# Patient Record
Sex: MALE | Race: WHITE | Employment: OTHER | URBAN - METROPOLITAN AREA
[De-identification: names, ages, dates, MRNs, and addresses within clinical notes are randomized per-mention and may not be internally consistent; named-entity substitution may affect disease eponyms.]

---

## 2021-03-05 ENCOUNTER — HOSPITAL ENCOUNTER (EMERGENCY)
Age: 66
Discharge: HOME | End: 2021-03-05
Attending: EMERGENCY MEDICINE
Payer: COMMERCIAL

## 2021-03-05 VITALS
HEART RATE: 72 BPM | SYSTOLIC BLOOD PRESSURE: 136 MMHG | RESPIRATION RATE: 16 BRPM | TEMPERATURE: 97.1 F | OXYGEN SATURATION: 98 % | DIASTOLIC BLOOD PRESSURE: 87 MMHG

## 2021-03-05 DIAGNOSIS — N20.1 URETEROLITHIASIS: Primary | ICD-10-CM

## 2021-03-05 LAB
-: ABNORMAL
ABSOLUTE EOS #: 0.05 K/UL (ref 0–0.44)
ABSOLUTE IMMATURE GRANULOCYTE: 0.04 K/UL (ref 0–0.3)
ABSOLUTE LYMPH #: 0.84 K/UL (ref 1.1–3.7)
ABSOLUTE MONO #: 0.6 K/UL (ref 0.1–1.2)
AMORPHOUS: ABNORMAL
ANION GAP SERPL CALCULATED.3IONS-SCNC: 10 MMOL/L (ref 9–17)
BACTERIA: ABNORMAL
BASOPHILS # BLD: 0 % (ref 0–2)
BASOPHILS ABSOLUTE: 0.03 K/UL (ref 0–0.2)
BILIRUBIN URINE: NEGATIVE
BUN BLDV-MCNC: 19 MG/DL (ref 8–23)
BUN/CREAT BLD: NORMAL (ref 9–20)
CALCIUM SERPL-MCNC: 9 MG/DL (ref 8.6–10.4)
CASTS UA: ABNORMAL /LPF (ref 0–8)
CHLORIDE BLD-SCNC: 104 MMOL/L (ref 98–107)
CO2: 25 MMOL/L (ref 20–31)
COLOR: YELLOW
CREAT SERPL-MCNC: 0.96 MG/DL (ref 0.7–1.2)
CRYSTALS, UA: ABNORMAL /HPF
DIFFERENTIAL TYPE: ABNORMAL
EOSINOPHILS RELATIVE PERCENT: 1 % (ref 1–4)
EPITHELIAL CELLS UA: ABNORMAL /HPF (ref 0–5)
GFR AFRICAN AMERICAN: >60 ML/MIN
GFR NON-AFRICAN AMERICAN: >60 ML/MIN
GFR SERPL CREATININE-BSD FRML MDRD: NORMAL ML/MIN/{1.73_M2}
GFR SERPL CREATININE-BSD FRML MDRD: NORMAL ML/MIN/{1.73_M2}
GLUCOSE BLD-MCNC: 96 MG/DL (ref 70–99)
GLUCOSE URINE: NEGATIVE
HCT VFR BLD CALC: 42.1 % (ref 40.7–50.3)
HEMOGLOBIN: 14.3 G/DL (ref 13–17)
IMMATURE GRANULOCYTES: 0 %
KETONES, URINE: NEGATIVE
LEUKOCYTE ESTERASE, URINE: NEGATIVE
LYMPHOCYTES # BLD: 8 % (ref 24–43)
MCH RBC QN AUTO: 33 PG (ref 25.2–33.5)
MCHC RBC AUTO-ENTMCNC: 34 G/DL (ref 28.4–34.8)
MCV RBC AUTO: 97.2 FL (ref 82.6–102.9)
MONOCYTES # BLD: 6 % (ref 3–12)
MUCUS: ABNORMAL
NITRITE, URINE: NEGATIVE
NRBC AUTOMATED: 0 PER 100 WBC
OTHER OBSERVATIONS UA: ABNORMAL
PDW BLD-RTO: 12.4 % (ref 11.8–14.4)
PH UA: 5 (ref 5–8)
PLATELET # BLD: 187 K/UL (ref 138–453)
PLATELET ESTIMATE: ABNORMAL
PMV BLD AUTO: 11.2 FL (ref 8.1–13.5)
POTASSIUM SERPL-SCNC: 4.3 MMOL/L (ref 3.7–5.3)
PROTEIN UA: NEGATIVE
RBC # BLD: 4.33 M/UL (ref 4.21–5.77)
RBC # BLD: ABNORMAL 10*6/UL
RBC UA: ABNORMAL /HPF (ref 0–4)
RENAL EPITHELIAL, UA: ABNORMAL /HPF
SARS-COV-2, RAPID: NOT DETECTED
SEG NEUTROPHILS: 85 % (ref 36–65)
SEGMENTED NEUTROPHILS ABSOLUTE COUNT: 9.39 K/UL (ref 1.5–8.1)
SODIUM BLD-SCNC: 139 MMOL/L (ref 135–144)
SPECIFIC GRAVITY UA: 1.01 (ref 1–1.03)
SPECIMEN DESCRIPTION: NORMAL
TRICHOMONAS: ABNORMAL
TURBIDITY: CLEAR
URINE HGB: ABNORMAL
UROBILINOGEN, URINE: NORMAL
WBC # BLD: 11 K/UL (ref 3.5–11.3)
WBC # BLD: ABNORMAL 10*3/UL
WBC UA: ABNORMAL /HPF (ref 0–5)
YEAST: ABNORMAL

## 2021-03-05 PROCEDURE — 96375 TX/PRO/DX INJ NEW DRUG ADDON: CPT

## 2021-03-05 PROCEDURE — 96374 THER/PROPH/DIAG INJ IV PUSH: CPT

## 2021-03-05 PROCEDURE — 2580000003 HC RX 258: Performed by: STUDENT IN AN ORGANIZED HEALTH CARE EDUCATION/TRAINING PROGRAM

## 2021-03-05 PROCEDURE — 81001 URINALYSIS AUTO W/SCOPE: CPT

## 2021-03-05 PROCEDURE — 82365 CALCULUS SPECTROSCOPY: CPT

## 2021-03-05 PROCEDURE — 80048 BASIC METABOLIC PNL TOTAL CA: CPT

## 2021-03-05 PROCEDURE — 6360000002 HC RX W HCPCS: Performed by: STUDENT IN AN ORGANIZED HEALTH CARE EDUCATION/TRAINING PROGRAM

## 2021-03-05 PROCEDURE — 85025 COMPLETE CBC W/AUTO DIFF WBC: CPT

## 2021-03-05 PROCEDURE — 99284 EMERGENCY DEPT VISIT MOD MDM: CPT

## 2021-03-05 PROCEDURE — U0002 COVID-19 LAB TEST NON-CDC: HCPCS

## 2021-03-05 RX ORDER — ONDANSETRON 2 MG/ML
4 INJECTION INTRAMUSCULAR; INTRAVENOUS ONCE
Status: COMPLETED | OUTPATIENT
Start: 2021-03-05 | End: 2021-03-05

## 2021-03-05 RX ORDER — LORATADINE 10 MG/1
10 CAPSULE, LIQUID FILLED ORAL DAILY
COMMUNITY

## 2021-03-05 RX ORDER — FLUOCINONIDE 0.5 MG/G
OINTMENT TOPICAL 2 TIMES DAILY
COMMUNITY

## 2021-03-05 RX ORDER — 0.9 % SODIUM CHLORIDE 0.9 %
1000 INTRAVENOUS SOLUTION INTRAVENOUS ONCE
Status: COMPLETED | OUTPATIENT
Start: 2021-03-05 | End: 2021-03-05

## 2021-03-05 RX ORDER — BETAMETHASONE DIPROPIONATE 0.5 MG/G
CREAM TOPICAL 2 TIMES DAILY
COMMUNITY

## 2021-03-05 RX ORDER — LEVOTHYROXINE SODIUM 0.12 MG/1
125 TABLET ORAL DAILY
COMMUNITY

## 2021-03-05 RX ORDER — MORPHINE SULFATE 4 MG/ML
4 INJECTION, SOLUTION INTRAMUSCULAR; INTRAVENOUS ONCE
Status: COMPLETED | OUTPATIENT
Start: 2021-03-05 | End: 2021-03-05

## 2021-03-05 RX ADMIN — SODIUM CHLORIDE 1000 ML: 9 INJECTION, SOLUTION INTRAVENOUS at 08:16

## 2021-03-05 RX ADMIN — MORPHINE SULFATE 4 MG: 4 INJECTION INTRAVENOUS at 08:17

## 2021-03-05 RX ADMIN — ONDANSETRON 4 MG: 2 INJECTION INTRAMUSCULAR; INTRAVENOUS at 08:18

## 2021-03-05 ASSESSMENT — ENCOUNTER SYMPTOMS
ABDOMINAL PAIN: 0
SHORTNESS OF BREATH: 0
COUGH: 0
VOMITING: 1
CHEST TIGHTNESS: 0
NAUSEA: 1
CONSTIPATION: 0
PHOTOPHOBIA: 0
DIARRHEA: 0
WHEEZING: 0
BACK PAIN: 0

## 2021-03-05 ASSESSMENT — PAIN DESCRIPTION - DESCRIPTORS: DESCRIPTORS: SHARP

## 2021-03-05 ASSESSMENT — PAIN DESCRIPTION - LOCATION: LOCATION: FLANK

## 2021-03-05 ASSESSMENT — PAIN SCALES - GENERAL: PAINLEVEL_OUTOF10: 10

## 2021-03-05 ASSESSMENT — PAIN DESCRIPTION - PAIN TYPE: TYPE: ACUTE PAIN

## 2021-03-05 NOTE — ED PROVIDER NOTES
[facility] ED  Emergency Department  Senior Resident Attestation     Primary Care Physician  No primary care provider on file. I performed a history and physical examination of the patient and discussed management with the gilberto resident. I reviewed the gilberto residents note and agree with the documented findings and plan of care. Any areas of disagreement are noted on the chart. Case was then discussed with Faculty Attending Supervisor for additional medical management. PERTINENT ATTENDING PHYSICIAN COMMENTS:    HISTORY:   Zaida Mera is a 60 y.o. male who  has no past medical history on file. and presents with complaint of right-sided flank pain. Diagnosed with a right-sided kidney stone [facility]. States that he is known 8 mm stone on the right and the supposed to have scheduled surgery with urology this coming Tuesday on 3/10/2021. States that he has been taking Norco and Motrin yesterday and last night. No relief. Worsening pain. Accompanying nausea and vomiting. PHYSICAL:   Temp: 97.1 °F (36.2 °C),  Pulse: 72, Resp: 16, BP: 136/87, SpO2: 98 %  Gen: Non-toxic, Afebrile  Neck: Supple  Cards: Regular rate and rhythm  Pulm: Lung sounds clear to auscultation  Abdomen: Soft, nondistended, right-sided flank pain present. Radiates down to the right groin. Skin: warm, dry  Extremities: pulses 2+ radial / dorsalis pedis, no clubbing, cyanosis, edema    IMPRESSION and PLAN  Patient with a known 8 mm stone per patient. Rehydrate, morphine, Zofran. Will give further pain medications as needed. Urology consult. likely admission. Patient spontaneously passed a large stone, dark-colored in nature. Urinalysis not indicative of infection. Urology said that patient could be discharged. ED Course as of Mar 05 1602   Maye Sidhu Mar 05, 2021   0292 Urology to admit and take to OR.     [JG]   [id number] Urology was going to admit patient and plan for procedure, however patient spontaneously passed a stone here in the emergency department and his pain is improved. Will discuss with urology. [JG]   [id number] Discussed with urology, will await urinalysis, then discharge plus or minus antibiotics. [JG]      ED Course User Index  [JG] Jane Chowdhury, DO         CRITICAL CARE: There was a high probability of clinically significant/life threatening deterioration in this patient's condition which required my urgent intervention. Total critical care time was 5 minutes. This excludes any time for separately reportable procedures.      Maury Aguillon MD  Senior Resident Physician    (Please note that portions of this note were completed with a voice recognition program.  Efforts were made to edit the dictations but occasionally words are mis-transcribed.)       Maury Aguillon MD  03/05/21 9939

## 2021-03-05 NOTE — ED NOTES
Urology resident at bedside. Pt updated on plan of care. Plan is for patient to go into surgery for removal of right kidney stone. Surgery consent form signed by patient with writer as witness.  Writer verified that patient had no further questions regarding surgical procedure      Vinayak Farrar RN  03/05/21 5629

## 2021-03-05 NOTE — ED PROVIDER NOTES
[facility] ED  Emergency Department Encounter  Emergency Medicine Resident     Pt Name: Juan Conner  MRN: [id number]  [place] 1955  Date of evaluation: 3/5/21  PCP:  No primary care provider on file. CHIEF COMPLAINT       Chief Complaint   Patient presents with    Flank Pain       HISTORY OFPRESENT ILLNESS  (Location/Symptom, Timing/Onset, Context/Setting, Quality, Duration, Modifying Factors,Severity.)      Juan Conner is a 60 y.o. male who presents with flank pain to abdominal pain. Patient has a known 8 mm kidney stone per patient. Patient follows with nephrology, Dr. Héctor Mayberry, and is scheduled to have lithotripsy. He has been taking his Norco and Flomax without significant improvement. Is much worse today and has had difficulty tolerating p.o. as well. Has had pain with urinating, and that makes the flank pain significantly worse. Patient could not stand the pain anymore and came in for further evaluation. PAST MEDICAL / SURGICAL / SOCIAL / FAMILY HISTORY      has no past medical history on file. has no past surgical history on file.      Social History     Socioeconomic History    Marital status:      Spouse name: Not on file    Number of children: Not on file    Years of education: Not on file    Highest education level: Not on file   Occupational History    Not on file   Social Needs    Financial resource strain: Not on file    Food insecurity     Worry: Not on file     Inability: Not on file    Transportation needs     Medical: Not on file     Non-medical: Not on file   Tobacco Use    Smoking status: Not on file   Substance and Sexual Activity    Alcohol use: Not on file    Drug use: Not on file    Sexual activity: Not on file   Lifestyle    Physical activity     Days per week: Not on file     Minutes per session: Not on file    Stress: Not on file   Relationships    Social connections     Talks on phone: Not on file     Gets together: Not on file     Attends [employer] service: Not on file     Active member of club or organization: Not on file     Attends meetings of clubs or organizations: Not on file     Relationship status: Not on file    Intimate partner violence     Fear of current or ex partner: Not on file     Emotionally abused: Not on file     Physically abused: Not on file     Forced sexual activity: Not on file   Other Topics Concern    Not on file   Social History Narrative    Not on file       History reviewed. No pertinent family history. Allergies:  Patient has no known allergies. Home Medications:  Prior to Admission medications    Medication Sig Start Date End Date Taking? Authorizing Provider   levothyroxine (SYNTHROID) 125 MCG tablet Take 125 mcg by mouth Daily   Yes Historical Provider, MD   fluocinonide (LIDEX) 0.05 % ointment Apply topically 2 times daily Apply topically 2 times daily. Yes Historical Provider, MD   betamethasone dipropionate (DIPROLENE) 0.05 % cream Apply topically 2 times daily Apply topically 2 times daily. Yes Historical Provider, MD   loratadine (CLARITIN) 10 MG capsule Take 10 mg by mouth daily   Yes Historical Provider, MD   FLUTICASONE PROPIONATE, NASAL, NA by Nasal route   Yes Historical Provider, MD       REVIEW OFSYSTEMS    (2-9 systems for level 4, 10 or more for level 5)      Review of Systems   Constitutional: Negative for chills, diaphoresis, fatigue and fever. Eyes: Negative for photophobia and visual disturbance. Respiratory: Negative for cough, chest tightness, shortness of breath and wheezing. Cardiovascular: Negative for chest pain, palpitations and leg swelling. Gastrointestinal: Positive for nausea and vomiting. Negative for abdominal pain, constipation and diarrhea. Endocrine: Negative for polydipsia, polyphagia and polyuria. Genitourinary: Positive for difficulty urinating and flank pain (R side). Negative for dysuria and hematuria.    Musculoskeletal: Negative for / EKG):  Orders Placed This Encounter   Procedures    COVID-19, Rapid    Stone analysis    CBC Auto Differential    Basic Metabolic Panel w/ Reflex to MG    Urinalysis with microscopic    Inpatient consult to Urology       MEDICATIONS ORDERED:  Orders Placed This Encounter   Medications    morphine injection 4 mg    ondansetron (ZOFRAN) injection 4 mg    0.9 % sodium chloride bolus       DDX: Nephrolithiasis, ureterolithiasis, EDUARDO, hydronephrosis, UTI, intra-abdominal pathology    Initial MDM/Plan: 60 y.o. male who presents with right-sided abdominal and flank pain. Known stone, states the pain is consistent with a stone ongoing for about a week. Patient is on Flomax and other medications per his urologist.  Plan for basic work-up to include urinalysis. Will discuss with urology pending results. DIAGNOSTIC RESULTS / EMERGENCYDEPARTMENT COURSE / MDM     LABS:  Labs Reviewed   CBC WITH AUTO DIFFERENTIAL - Abnormal; Notable for the following components:       Result Value    Seg Neutrophils 85 (*)     Lymphocytes 8 (*)     Segs Absolute 9.39 (*)     Absolute Lymph # 0.84 (*)     All other components within normal limits   URINALYSIS WITH MICROSCOPIC - Abnormal; Notable for the following components:    Urine Hgb TRACE (*)     All other components within normal limits   COVID-19, RAPID   STONE ANALYSIS   BASIC METABOLIC PANEL W/ REFLEX TO MG FOR LOW K         RADIOLOGY:  No results found. EKG      All EKG's are interpreted by the Emergency Department Physicianwho either signs or Co-signs this chart in the absence of a cardiologist.    EMERGENCY DEPARTMENT COURSE:  ED Course as of Mar 05 1404   Fri Mar 05, 2021   4506 Urology to admit and take to OR. [JG]   [id number] Urology was going to admit patient and plan for procedure, however patient spontaneously passed a stone here in the emergency department and his pain is improved. Will discuss with urology.     [JG]   [id number] Discussed with urology, will await urinalysis, then discharge plus or minus antibiotics. [JG]      ED Course User Index  [JG] Anthony Matthew DO          PROCEDURES:  None    CONSULTS:  IP CONSULT TO UROLOGY    CRITICAL CARE:  Please see attending note    FINAL IMPRESSION      1.  Ureterolithiasis          DISPOSITION / PLAN     DISPOSITION Decision To Discharge 03/05/2021 10:26:30 AM      PATIENT REFERRED TO:  [facility] ED  [address] [address]  [phone]  Go to   If symptoms worsen    Jassi Callahan MD  [place] [phone]    Schedule an appointment as soon as possible for a visit         DISCHARGE MEDICATIONS:  Discharge Medication List as of 3/5/2021 10:28 AM          Anthony Matthew DO  Emergency Medicine Resident    (Please note that portions of this note were completed with a voice recognition program.Efforts were made to edit the dictations but occasionally words are mis-transcribed.)     Anthony Matthew DO  Resident  03/05/21 2196

## 2021-03-05 NOTE — ED NOTES
Pt states he has 8 mm kidney stone that has resulted in right side flank pain. He was supposed to get surgery on the 10th, but the pain is unbearable. He denies any blood in his urine, difficulty urinating or producing urine. He denies any other pain, any shortness of breath or any pertinent PMH. Pt is alert and oriented x4 and resting in bed.        Aldair Younger, LILY  03/05/21 [address], RN  03/05/21 7119

## 2021-03-05 NOTE — ED PROVIDER NOTES
[facility] ED     Emergency Department     Faculty Attestation        I performed a history and physical examination of the patient and discussed management with the resident. I reviewed the residents note and agree with the documented findings and plan of care. Any areas of disagreement are noted on the chart. I was personally present for the key portions of any procedures. I have documented in the chart those procedures where I was not present during the key portions. I have reviewed the emergency nurses triage note. I agree with the chief complaint, past medical history, past surgical history, allergies, medications, social and family history as documented unless otherwise noted below. For mid-level providers such as nurse practitioners as well as physicians assistants:    I have personally seen and evaluated the patient. I find the patient's history and physical exam are consistent with NP/PA documentation. I agree with the care provided, treatment rendered, disposition, & follow-up plan. Additional findings are as noted. Vital Signs: BP (!) 151/93   Pulse 72   Temp 97.1 °F (36.2 °C)   Resp 16   SpO2 96%   PCP:  No primary care provider on file. Pertinent Comments:     States he was at [facility] and diagnosed with 8 mm kidney stone via CT. He has follow-up with urologist on the 10th but states the pain is unbearable and has been taking little different pain medications with minimal relief.   He is afebrile nontoxic will check labs urinalysis discussed with urology      Critical Care  None          Jasmine Lorenzana MD  Attending Emergency Medicine Physician              Treasure Wheat MD  03/05/21 9784

## 2021-03-08 LAB
STONE COMPOSITION: NORMAL
STONE DESCRIPTION: NORMAL
STONE MASS: 86 MG
STONE NUMBER: 1
STONE SIZE: NORMAL MM

## 2023-01-13 ENCOUNTER — HOSPITAL ENCOUNTER (OUTPATIENT)
Dept: PHYSICAL THERAPY | Facility: CLINIC | Age: 68
Setting detail: THERAPIES SERIES
Discharge: HOME | End: 2023-01-13
Payer: MEDICARE

## 2023-01-13 PROCEDURE — 97110 THERAPEUTIC EXERCISES: CPT

## 2023-01-13 PROCEDURE — 97161 PT EVAL LOW COMPLEX 20 MIN: CPT

## 2023-01-13 NOTE — FLOWSHEET NOTE
[facility] Fall Risk Assessment    Patient Name:  Behzad Dillard  : 1955    Risk Factor Scale  Score   History of Falls [] Yes  [x] No 25  0 0   Secondary Diagnosis [] Yes  [x] No 15  0 0   Ambulatory Aid [] Furniture  [] Crutches/cane/walker  [x] None/bedrest/wheelchair/nurse 30  15  0 0   IV/Heparin Lock [] Yes  [x] No 20  0 0   Gait/Transferring [] Impaired  [] Weak  [x] Normal/bedrest/immobile 20  10  0 0   Mental Status [] Forgets limitations  [x] Oriented to own ability 15  0 0      Total:0     Based on the Assessment score: check the appropriate box.     [x]  No intervention needed   Low =   Score of 0-24    []  Use standard prevention interventions Moderate =  Score of 24-44   [] Give patient handout and discuss fall prevention strategies   [] Establish goal of education for patient/family RE: fall prevention strategies    []  Use high risk prevention interventions High = Score of 45 and higher   [] Give patient handout and discuss fall prevention strategies   [] Establish goal of education for patient/family Re: fall prevention strategies   [] Discuss lifeline / other resources    Electronically signed by:   Chloé Danielson PT  Date: 2023

## 2023-01-13 NOTE — CONSULTS
[] [facility]) [facility] &  Therapy  [address]  [phone]  F: [phone] [] [address]  [facility]   [address]  P: [phone]  F: [phone] [x] [address] &  Therapy  [address]  P: [phone]  F: [phone] [] [address]  P: [phone]  F: [phone] [] [address]  [facility]   [address]   [place]: [phone]  F: [phone]      Physical Therapy Lower Extremity Evaluation    Date:  2023  Patient: Tonya Jean  : 1955  MRN: [id number]  Physician: Andree Santillan DO   Insurance: [facility] Medicare (no yellow sheet)   Medical Diagnosis:   Diagnosis   M17.0 (ICD-10-CM) - Bilateral primary osteoarthritis of knee       Rehab Codes: M25.562, M25.561, M25.662, R26.89, M62.81  Onset date: 2023 (script)  Next 's appt.: PRN    Subjective:   CC/HPI: Patient reports to physical therapy this date with (B) knee pain. Patient reports that years ago he began to experience pain in the knees. Patient states that over time the pain has progressed and at this time the (L) knee is worse than the (R). Patient states that he has the most difficulty with walking, standing, sitting to standing and sitting for prolonged periods of time. States that heat provides some relief to (B) knees, however unable to find relief from any form of medication. States that he did receive x-rays last week that showed arthritis and bone spurs. States that he also received steroid injections into (B) knees with minimal relief. Patient reports that he was referred to physical therapy at this time for aquatic based physical therapy.      PMHx: [] Unremarkable [] Diabetes [] HTN  [] Pacemaker   [] MI/Heart Problems  [] Cancer   [x] Arthritis [x] Other: Gout, hx of (B) total shoulder replacements, (R) foot surgery               [x] Refer to full medical chart  In EPIC       Comorbidities:   [] Obesity [] Dialysis  [] N/A   [] Asthma/COPD [] Dementia [] Other:    [] Stroke [] Sleep apnea [] Other:   [] Vascular disease [] Rheumatic disease [] Other:     Tests: [x] X-Ray: [] MRI:  [] Other:   RESULT: Bilateral 4 view knees compared with 02/02/2021. RIGHT: No effusion, fracture or dislocation. Small extensor mechanism   enthesophytes are unchanged. Interval progression and lateral   compartment narrowing now severe posteriorly, with similar small   osteophytes. The medial joint space is preserved with similar   osteophytes. Small patellofemoral osteophytes are present without   narrowing tilt or subluxation. LEFT: There may be a small effusion. Unchanged extensor mechanism   enthesophytes. Small patellofemoral osteophytes without narrowing tilt   or subluxation are similar to the prior. Mild medial and lateral   compartment narrowing is similar to the prior with small osteophytes and   medial joint line joint body or heterotopic ossification    Medications: [x] Refer to full medical record [] None [] Other:  Allergies:      [x] Refer to full medical record [] None [] Other:    Function:  Hand Dominance  [] Right  [] Left  Type of house 2 story    Stairs into the home 3-4 steps    Handrails (R) side    Bathroom setup  Walk in [place] [address]   Job Status []  Normal duty   [] Light duty   [] Off due to condition    [x]  Retired   [] Not employed   [] Disability  [] Other:  []  Return to work:    Work activities/duties        ADL/IADL Previous level of function Current level of function Who currently assists the patient with task   Bathing  [x] Independent  [] Assist [x] Independent  [] Assist    Dress/grooming [x] Independent  [] Assist [x] Independent  [] Assist    Transfer/mobility [x] Independent  [] Assist [x] Independent  [] Assist Patient states he can ambulate up to 1 mile before requiring rest break    Feeding [x] Independent  [] Assist [x] Independent  [] Assist    Toileting [x] Independent  [] Assist [x] Independent  [] Assist    Driving [x] Independent  [] Assist [x] Independent  [] Assist    Housekeeping [x] Independent  [] Assist [x] Independent  [] Assist Difficulty depending on how long he has been on his feet    Grocery shop/meal prep [x] Independent  [] Assist [x] Independent  [] Assist Difficulty depending on how long he has been on his feet      Gait Prior level of function Current level of function    [x] Independent  [] Assist [x] Independent  [] Assist   Device: [x] Independent [x] Independent    [] Straight Cane [] Quad cane [] Straight Cane [] Quad cane    [] Standard walker [] Rolling walker   [] 4 wheeled walker [] Standard walker [] Rolling walker   [] 4 wheeled walker    [] Wheelchair [] Wheelchair     Pain:  [x] Yes  [] No Location: (B) knees Pain Rating: (0-10 scale) 3/10 at rest, 8/10 at the eworst   Pain altered Tx:  [] Yes  [] No  Action:    Symptoms:  [] Improving [x] Worsening [] Same  Better:  [] AM    [] PM    [] Sit    [] Rise/Sit    []Stand    [] Walk    [] Lying    [x] Other: Heat  Worse: [] AM    [] PM    [x] Sit    [x] Rise/Sit    [x]Stand    [x] Walk    [] Lying    [] Bend                      [] Valsalva     []Other:  Sleep: [] OK    [x] Disturbed    Objective:    ROM  ° A/P STRENGTH TESTS (+/-) Left Right Not Tested    Left Right Left Right Ant.  Drawer   [x]   Hip Flex   4 4 Post. Drawer   [x]   Ext   5 5 Lachmans   [x]   ER 35 30 4 4 Valgus Stress   [x]   IR 20 30 5 5 Varus Stress   [x]   ABD   4 5 Beverlys   [x]   ADD     Bounce Home   [x]   Knee Flex 105 120 4 4 Apleys Dist.   [x]   Ext -3 -3 5 5 Hip Scouring   [x]   Ankle DF 0 -5 5 5 NGOZIs   [x]   PF 70 70 5 5 Piriformis   [x]   INV     Zacs   [x]   EVER     Talor Tilt   [x]        Pat-Fem Grind   [x]       OBSERVATION No Deficit Deficit Not Tested Comments   Posture       Forward Head [] [x] []    Rounded Shoulders [] [x] []    Iliac Crest [x] [] []    PSIS [x] [] []    ASIS [x] [] []    Genu Valgus [x] [] []    Genu Varus [] [x] []    Genu Recurvatum [x] [] []    Pronation [x] [] []    Supination [x] [] []    Palpation [] [x] [] TTP at (L) medial meniscus     Sensation [x] [] []    Edema [] [] [x]    Neurological [] [] [x]    Patellar Mobility [x] [] []    Patellar Orientation [x] [] []    Gait [] [x] [] Analysis: Patient ambulates  with decreased sherry, decreased stance time on (L) LE and increased hip ER          FUNCTION Normal Difficult Unable   Sitting [x] [] []   Standing [] [x] []   Ambulation [] [x] []   Groom/Dress [x] [] []   Lift/Carry [x] [] []   Stairs [] [x] []   Bending [x] [] []   Squat [] [] [x]   Kneel [] [x] []     BALANCE/PROPRIOCEPTION              [] Not tested   Single leg stance       R                     L                                PAIN   Eyes open                      30   Sec. 18       Sec                  . [x]    Eyes closed                          Sec. Sec                  . []        FUNCTIONAL TESTS PAIN NO PAIN COMMENTS   30 sec STS [] []    Squat [] []      Functional Test: LEFS Score: 67% functionally impaired     Comments:    Assessment:  Patient is a 78year old male who presents to physical therapy with (B) knee pain. Patient demonstrates impairments in pain tolerance, (L) knee AROM, (L) hip AROM, (B) LE strength, balance and gait mechanics. These impairments affect the patient's ability to complete ADLs, household related tasks and ambulate through the community without increased pain. Patient would benefit from skilled physical therapy in order to improve impairments, improve overall quality of life and increase ease of working. Educated patient on evaluation findings and poc. Problems:    [x] ? Pain:  [x] ? ROM:  [x] ? Strength:  [x] ?  Function:  [x] Other: Balance       STG: (to be met in 6 treatments)  ? Pain: Patient will report pain as 0/10 at rest   ? ROM: Patient will improve (L) knee AROM to 0-120 deg in order to increase ease of negotiating stairs   ? Function: Patient will be able to ambulate around clinic independently with increased sherry and increased stance time on (L) LE  Patient to be independent with home exercise program as demonstrated by performance with correct form without cues. LTG: (to be met in 12 treatments)  Patient will improve LEFS to <50% functionally impaired in order to indicate improved overall quality of life  Patient will improve (B) LE strength to 4+/5 in order to increase walking tolerance throughout the community   Patient will improve (L) SLS to 30 sec in order to improve balance and stability within (B) LE  Patient will report pain at the worst as 2/10                   Patient goals: \"Pain reduction and ability to walk with minimal pain \"    Rehab Potential:  [x] Good  [] Fair  [] Poor   Suggested Professional Referral:  [x] No  [] Yes:  Barriers to Goal Achievement:  [x] No  [] Yes:  Domestic Concerns:  [x] No  [] Yes:    Pt. Education:  [x] Plans/Goals, Risks/Benefits discussed  [x] Home exercise program    Access Code: [email or web address]  URL: [email or web address]  Date: 01/13/2023  Prepared by: Fabio Fairly    Exercises  Seated Hamstring Stretch - 2 x daily - 7 x weekly - 3 sets - 30 sec hold  Supine Calf Stretch with Strap - 2 x daily - 7 x weekly - 3 sets - 30 sec hold  Supine Heel Slide - 2 x daily - 7 x weekly - 2 sets - 10 reps  Active Straight Leg Raise with Quad Set - 2 x daily - 7 x weekly - 2 sets - 10 reps  Supine Bridge - 2 x daily - 7 x weekly - 2 sets - 10 reps  Clamshell - 2 x daily - 7 x weekly - 2 sets - 10 reps  Sidelying Hip Abduction - 2 x daily - 7 x weekly - 2 sets - 10 reps      Method of Education: [x] Verbal  [x] Demo  [x] Written  Comprehension of Education:  [x] Verbalizes understanding.   [x] Demonstrates understanding. [x] Needs Review. [] Demonstrates/verbalizes understanding of HEP/Ed previously given. Treatment Plan:  [x] Therapeutic Exercise   47416  [] Iontophoresis: 4 mg/mL Dexamethasone Sodium Phosphate  mAmin  94652   [x] Therapeutic Activity  36743 [x] Vasopneumatic cold with compression  02237    [x] Gait Training   51172 [] Ultrasound   15888   [] Neuromuscular Re-education  85201 [] Electrical Stimulation Unattended  45033   [x] Manual Therapy  92557 [] Electrical Stimulation Attended  68296   [x] Instruction in HEP  [] Lumbar/Cervical Traction  23207   [x] Aquatic Therapy   53702 [x] Cold/hotpack    [] Massage   39158      [] Dry Needling, 1 or 2 muscles  92904   [] Biofeedback, first 15 minutes   71488  [] Biofeedback, additional 15 minutes   58438 [] Dry Needling, 3 or more muscles  98049     [x]  Medication allergies reviewed for use of    Dexamethasone Sodium Phosphate 4mg/ml     with iontophoresis treatments. Pt is not allergic.     Frequency:  2 x/week for 12 visits        Todays Treatment:  Modalities:   Precautions: Gout   Exercises:  Exercise Reps/ Time Weight/ Level Comments   Initiate aquatic therapy             Supine Calf S      HS S 30\"x3           Heel Slide x20  (L) side only   SLR x15     Bridges x15     Clamshells x15     SL Hip Abd x15           Other:    Specific Instructions for next treatment: Initiate aquatic ex program to improve (B) knee mobility, strength and stability       Evaluation Complexity:  History (Personal factors, comorbidities) [x] 0 [] 1-2 [] 3+   Exam (limitations, restrictions) [x] 1-2 [] 3 [] 4+   Clinical presentation (progression) [x] Stable [] Evolving  [] Unstable   Decision Making [x] Low [] Moderate [] High    [x] Low Complexity [] Moderate Complexity [] High Complexity       Treatment Charges: Mins Units   [x] Evaluation       [x]  Low       []  Moderate       []  High 34 1   []  Modalities     [x]  Ther Exercise 17 1   []  Manual Therapy []  Ther Activities     []  Aquatics     []  Vasocompression     []  Other       TOTAL TREATMENT TIME: 51 min    Time in: 10:09 am   Time Out: 11:00 am    Electronically signed by: Michael Serrano PT        Physician Signature:________________________________Date:__________________  By signing above or cosigning this note, I have reviewed this plan of care and certify a need for medically necessary rehabilitation services.      *PLEASE SIGN ABOVE AND FAX BACK ALL PAGES*

## 2023-01-16 ENCOUNTER — HOSPITAL ENCOUNTER (OUTPATIENT)
Dept: PHYSICAL THERAPY | Facility: CLINIC | Age: 68
Setting detail: THERAPIES SERIES
Discharge: HOME | End: 2023-01-16
Payer: MEDICARE

## 2023-01-16 PROCEDURE — 97113 AQUATIC THERAPY/EXERCISES: CPT

## 2023-01-16 NOTE — FLOWSHEET NOTE
[x] [facility] Outpatient Rehabilitation & Therapy  [address]  P: [phone]  F: [phone]     Physical Therapy Daily  Aquatic Treatment Note    Date:  2023  Patient Name:  Hannah Castellanos    :  1955  MRN: [id number]  Physician: Keith Stokes DO                           Insurance: [facility], per lisbet yr, no auth required, vs. Bomn, no co pay, 10% co insurance, 800 ded. Medical Diagnosis:   Diagnosis   M17.0 (ICD-10-CM) - Bilateral primary osteoarthritis of knee                             Rehab Codes: M25.562, M25.561, M25.662, R26.89, M62.81  Onset date: 2023 (script)             Next 's appt.: PRN    Visit# / total visits:  PN by vs. 10  Cancels/No Shows:     Subjective:    Pain:  [] Yes  [x] No Location: bilat knees Pain Rating: (0-10 scale) 0-1/10  Pain altered Tx:  [x] No  [] Yes  Action:  Comments:Pt reports not much pain today so far, but has not been on his feet much. Pt notes pain increases with walking and activity.     Objective:     KEY  B = Belt G = Gloves N = Noodle   C = Cuffs K = Kickboard P = Paddles   CC = Cervical Collar L = Laps T = Theratube   DB = Dumbells M = Minutes W = Weights     Exercises/Activities  Warm-up/Amb  Dynamic Exercises    Forward 3L March 3L   Sideways 3L Squat    Backwards 3L Retro HS curls      Retro SLR    Stretches  Braiding    Gastroc/Soleus standing at wall 20\"x3 Heel to Toe amb    Hamstring foot on stair 20\"x3 Toe amb    Hip flexor  Heel amb    Piriformis      SKTC      Pec Stretch      Post Deltoid  Static Exercises UE      Shoulder flex/ext    Static Exercises LE  Shoulder abd/add    Heel/toe raises 10 Shoulder H.  abd/add    Marches 10 Shoulder IR/ER    Mini-squats 10 Rowing    4-way hip  10 Arm Circles    Hamstring curls 10 UT shrugs/rolls    Hip Circles/Fig 8  Scap squeezes    Ankle ROM  Diagonals 1/2    Lunges   Elbow flex/ext      Pron/Sup    Functional Exercise  Wrist AROM    Step      Wall Push-ups  Deep H20/    SLS  Bike 5m   Breast Stroke on Noodle  Hip abd/add    Noodle Twist  Hip flex/ext    Noodle Push down  Hip IR/ER    Kickboard push/pull  Knee flex/ext      Push/pull on [employer] 5m   Other:    Specific Instructions for next treatment:    Treatment Charges: Mins Units   []  Modalities     []  Ther Exercise     []  Manual Therapy     []  Ther Activities     [x]  Aquatics 35 2   []  Other       Assessment: [x] Progressing toward goals. Initiated aquatic ex per flow sheet with VC and demo all ex for proper posture and tech. Pt with good cande and no pain during ex. Pt notes his calf muscles feel very tight. Stretching added end of session to decrease muscle tension. To deep water to decrease weight on LE for bike and hang. Pt feels good after tx with no c/o pain or soreness. Will monitor response to tx and plan to progress as pt cande. [] No change. [] Other:  STG: (to be met in 6 treatments)  ? Pain: Patient will report pain as 0/10 at rest   ? ROM: Patient will improve (L) knee AROM to 0-120 deg in order to increase ease of negotiating stairs   ? Function: Patient will be able to ambulate around clinic independently with increased sherry and increased stance time on (L) LE  Patient to be independent with home exercise program as demonstrated by performance with correct form without cues. LTG: (to be met in 12 treatments)  Patient will improve LEFS to <50% functionally impaired in order to indicate improved overall quality of life  Patient will improve (B) LE strength to 4+/5 in order to increase walking tolerance throughout the community   Patient will improve (L) SLS to 30 sec in order to improve balance and stability within (B) LE  Patient will report pain at the worst as 2/10                    Patient goals: \"Pain reduction and ability to walk with minimal pain \"    Pt.  Education:  [x] Yes  [] No  [] Reviewed Prior HEP/Ed  Method of Education: [x] Verbal  [] Demo  [] Written  Comprehension of Education:  [x] Verbalizes understanding. [] Demonstrates understanding. [] Needs review. [] Demonstrates/verbalizes HEP/Ed previously given. Plan: [x] Continue per plan of care.    [] Other:      Time In:3:25 pm            Time Out: 4:10 pm    Electronically signed by:  Bertram Blake PTA

## 2023-01-19 ENCOUNTER — HOSPITAL ENCOUNTER (OUTPATIENT)
Dept: PHYSICAL THERAPY | Facility: CLINIC | Age: 68
Setting detail: THERAPIES SERIES
Discharge: HOME | End: 2023-01-19
Payer: MEDICARE

## 2023-01-19 PROCEDURE — 97113 AQUATIC THERAPY/EXERCISES: CPT

## 2023-01-19 NOTE — FLOWSHEET NOTE
[x] [address] Outpatient Rehabilitation & Therapy  [address] [facility]  P: [phone]  F: [phone]     Physical Therapy Daily  Aquatic Treatment Note    Date:  2023  Patient Name:  Warden Holloway    :  1955  MRN: [id number]  Physician: Mark Self DO                           Insurance: [facility], per lisbet yr, no auth required, vs. Bomn, no co pay, 10% co insurance, 800 ded. Medical Diagnosis:   Diagnosis   M17.0 (ICD-10-CM) - Bilateral primary osteoarthritis of knee                             Rehab Codes: M25.562, M25.561, M25.662, R26.89, M62.81  Onset date: 2023 (script)             Next Dr's appt.: PRN    Visit# / total visits: 3/12 PN by vs. 10  Cancels/No Shows:     Subjective:    Pain:  [] Yes  [x] No Location: bilat knees Pain Rating: (0-10 scale) 2/10  Pain altered Tx:  [x] No  [] Yes  Action:  Comments: No issues following first aquatic session. Notes minimal pain today. States he hasn't done much walking yet today.      Objective:     KEY  B = Belt G = Gloves N = Noodle   C = Cuffs K = Kickboard P = Paddles   CC = Cervical Collar L = Laps T = Theratube   DB = Dumbells M = Minutes W = Weights     Exercises/Activities  Warm-up/Amb  Dynamic Exercises    Forward 3L March 3L   Sideways 3L Squat    Backwards 3L Retro HS curls      Retro SLR    Stretches  Braiding    Gastroc/Soleus standing at wall 20\"x3 Heel to Toe amb    Hamstring foot on stair 20\"x3 Toe amb    Hip flexor  Heel amb    Piriformis      SKTC      Pec Stretch      Post Deltoid  Static Exercises UE      Shoulder flex/ext    Static Exercises LE  Shoulder abd/add    Heel/toe raises 15 Shoulder H.  abd/add    Marches 15 Shoulder IR/ER    Mini-squats 15 Rowing    4-way hip  15 Arm Circles    Hamstring curls 15 UT shrugs/rolls    Hip Circles/Fig 8  Scap squeezes    Ankle ROM  Diagonals 1/2    Lunges   Elbow flex/ext      Pron/Sup    Functional Exercise  Wrist AROM    Step 15     Wall Push-ups Deep H20/    SLS  Bike 3m   Breast Stroke on Noodle  Hip abd/add 3m   Noodle Twist  Hip flex/ext    Noodle Push down  Hip IR/ER    Kickboard push/pull  Knee flex/ext      Push/pull on [employer] 5m   Other:    Specific Instructions for next treatment:     Treatment Charges: Mins Units   []  Modalities     []  Ther Exercise     []  Manual Therapy     []  Ther Activities     [x]  Aquatics 35 2   []  Other       Assessment: [x] Progressing toward goals. Cont with aquatic ex as noted in log above with good tolerance. Progressed his program with increased reps for static LE ex. Addition of step ups and deep water hip abd/add for further LE strengthening. No pain behaviors observed throughout progression of treatment. Will monitor response to session and cont to progress per tolerance. [] No change. [] Other:  STG: (to be met in 6 treatments)  ? Pain: Patient will report pain as 0/10 at rest   ? ROM: Patient will improve (L) knee AROM to 0-120 deg in order to increase ease of negotiating stairs   ? Function: Patient will be able to ambulate around clinic independently with increased sherry and increased stance time on (L) LE  Patient to be independent with home exercise program as demonstrated by performance with correct form without cues. LTG: (to be met in 12 treatments)  Patient will improve LEFS to <50% functionally impaired in order to indicate improved overall quality of life  Patient will improve (B) LE strength to 4+/5 in order to increase walking tolerance throughout the community   Patient will improve (L) SLS to 30 sec in order to improve balance and stability within (B) LE  Patient will report pain at the worst as 2/10                    Patient goals: \"Pain reduction and ability to walk with minimal pain \"    Pt. Education:  [x] Yes  [] No  [] Reviewed Prior HEP/Ed  Method of Education: [x] Verbal  [] Demo  [] Written  Comprehension of Education:  [x] Verbalizes understanding.   [] Demonstrates understanding. [] Needs review. [] Demonstrates/verbalizes HEP/Ed previously given. Plan: [x] Continue per plan of care.    [] Other:      Time In: 3:25 pm            Time Out: 4:10 pm    Electronically signed by:  Frieda Peacock PTA

## 2023-01-23 ENCOUNTER — HOSPITAL ENCOUNTER (OUTPATIENT)
Dept: PHYSICAL THERAPY | Facility: CLINIC | Age: 68
Setting detail: THERAPIES SERIES
Discharge: HOME | End: 2023-01-23
Payer: MEDICARE

## 2023-01-23 PROCEDURE — 97113 AQUATIC THERAPY/EXERCISES: CPT

## 2023-01-23 NOTE — FLOWSHEET NOTE
[x] [facility]  [address] [facility]  P: [phone]  F: [phone]     Physical Therapy Daily  Aquatic Treatment Note    Date:  2023  Patient Name:  Taryn Manzo    :  1955  MRN: [id number]  Physician: Crystal Araiza DO                           Insurance: [facility], per lisbet yr, no auth required, vs. Bomn, no co pay, 10% co insurance, 800 ded. Medical Diagnosis:   Diagnosis   M17.0 (ICD-10-CM) - Bilateral primary osteoarthritis of knee                             Rehab Codes: M25.562, M25.561, M25.662, R26.89, M62.81  Onset date: 2023 (script)             Next Dr's appt.: PRN    Visit# / total visits:  PN by vs. 10  Cancels/No Shows:     Subjective:    Pain:  [x] Yes  [] No Location: bilat knees Pain Rating: (0-10 scale) 2/10  Pain altered Tx:  [x] No  [] Yes  Action:  Comments: Pt reports his usual pain level this morning. No issues following last visit.      Objective:     KEY  B = Belt G = Gloves N = Noodle   C = Cuffs K = Kickboard P = Paddles   CC = Cervical Collar L = Laps T = Theratube   DB = Dumbells M = Minutes W = Weights     Exercises/Activities  Warm-up/Amb  Dynamic Exercises    Forward 3L March 3L   Sideways 3L Squat 3L   Backwards 3L Retro HS curls      Retro SLR    Stretches  Braiding    Gastroc/Soleus standing at wall 20\"x3 Heel to Toe amb    Hamstring foot on stair 20\"x3 Toe amb    Hip flexor  Heel amb    Piriformis      SKTC      Pec Stretch      Post Deltoid  Static Exercises UE      Shoulder flex/ext    Static Exercises LE  Shoulder abd/add    Heel/toe raises 15 Shoulder H.  abd/add    Marches 15 Shoulder IR/ER    Mini-squats 15 Rowing    4-way hip  15 Arm Circles    Hamstring curls 15 UT shrugs/rolls    Hip Circles/Fig 8  Scap squeezes    Ankle ROM  Diagonals 1/2    Lunges   Elbow flex/ext      Pron/Sup    Functional Exercise  Wrist AROM    Step 15 fwd/lat     Wall Push-ups  Deep H20/    SLS  Bike 3m Breast Stroke on Noodle  Hip abd/add 3m   Noodle Twist  Hip flex/ext    Noodle Push down  Hip IR/ER    Kickboard push/pull  Knee flex/ext      Push/pull on [employer] 5m   Other:    Specific Instructions for next treatment:     Treatment Charges: Mins Units   []  Modalities     []  Ther Exercise     []  Manual Therapy     []  Ther Activities     [x]  Aquatics 35 2   []  Other       Assessment: [x] Progressing toward goals. Cont with aquatic ex as noted in log above with good tolerance. Progressed his program with addition of dynamic squats and lateral step ups. He reports increased L kne \"pinching\" with flexion ex but tolerable. Ended with deep water ex followed by stretches for pain relief and flexibility. Will monitor response to session and progress per tolerance. [] No change. [] Other:  STG: (to be met in 6 treatments)  ? Pain: Patient will report pain as 0/10 at rest   ? ROM: Patient will improve (L) knee AROM to 0-120 deg in order to increase ease of negotiating stairs   ? Function: Patient will be able to ambulate around clinic independently with increased sherry and increased stance time on (L) LE  Patient to be independent with home exercise program as demonstrated by performance with correct form without cues. LTG: (to be met in 12 treatments)  Patient will improve LEFS to <50% functionally impaired in order to indicate improved overall quality of life  Patient will improve (B) LE strength to 4+/5 in order to increase walking tolerance throughout the community   Patient will improve (L) SLS to 30 sec in order to improve balance and stability within (B) LE  Patient will report pain at the worst as 2/10                    Patient goals: \"Pain reduction and ability to walk with minimal pain \"    Pt. Education:  [x] Yes  [] No  [] Reviewed Prior HEP/Ed  Method of Education: [x] Verbal  [x] Demo  [] Written  Comprehension of Education:  [x] Verbalizes understanding.   [x] Demonstrates understanding. [] Needs review. [] Demonstrates/verbalizes HEP/Ed previously given. Plan: [x] Continue per plan of care.    [] Other:      Time In: 8:30 am           Time Out: 9:30 am    Electronically signed by:  Kelli Aase, PTA

## 2023-01-26 ENCOUNTER — HOSPITAL ENCOUNTER (OUTPATIENT)
Dept: PHYSICAL THERAPY | Facility: CLINIC | Age: 68
Setting detail: THERAPIES SERIES
Discharge: HOME | End: 2023-01-26
Payer: MEDICARE

## 2023-01-26 PROCEDURE — 97113 AQUATIC THERAPY/EXERCISES: CPT

## 2023-01-26 NOTE — FLOWSHEET NOTE
[x] [address] [facility] [facility]  [address] [facility]  P: [phone]  F: [phone]     Physical Therapy Daily  Aquatic Treatment Note    Date:  2023  Patient Name:  Taryn Manzo    :  1955  MRN: [id number]  Physician: Crystal Araiza DO                           Insurance: [facility], per lisbet yr, no auth required, vs. Bom[employer], no co pay, 10% co insurance, 800 ded. Medical Diagnosis:   Diagnosis   M17.0 (ICD-10-CM) - Bilateral primary osteoarthritis of knee                             Rehab Codes: M25.562, M25.561, M25.662, R26.89, M62.81  Onset date: 2023 (script)             Next 's appt.: PRN    Visit# / total visits:  PN by vs. 10  Cancels/No Shows:     Subjective:    Pain:  [x] Yes  [] No Location: bilat knees Pain Rating: (0-10 scale) 2/10  Pain altered Tx:  [x] No  [] Yes  Action:  Comments: Minimal pain to report. No soreness following last treatment.      Objective:     KEY  B = Belt G = Gloves N = Noodle   C = Cuffs K = Kickboard P = Paddles   CC = Cervical Collar L = Laps T = Theratube   DB = Dumbells M = Minutes W = Weights     Exercises/Activities  Warm-up/Amb  Dynamic Exercises    Forward 3L March 3L   Sideways 3L Squat 3L   Backwards 3L Retro HS curls      Retro SLR    Stretches  Braiding    Gastroc/Soleus standing at wall 20\"x3 Heel to Toe amb    Hamstring foot on stair 20\"x3 Toe amb    Hip flexor  Heel amb    Piriformis      SKTC      Pec Stretch      Post Deltoid  Static Exercises UE      Shoulder flex/ext 15   Static Exercises LE  Shoulder abd/add 15   Heel/toe raises 20 Shoulder H.  abd/add 15    20 Shoulder IR/ER    Mini-squats 20 Rowing    4-way hip  20 Arm Circles    Hamstring curls 20 UT shrugs/rolls    Hip Circles/Fig 8  Scap squeezes    Ankle ROM  Diagonals 1/2    Lunges   Elbow flex/ext      Pron/Sup    Functional Exercise  Wrist AROM    Step 15 fwd/lat     Wall Push-ups  Deep H20/    SLS  Bike 3m   Breast Stroke on Noodle  Hip abd/add 3m   Noodle Twist  Hip flex/ext    Noodle Push down  Hip IR/ER    Kickboard push/pull  Knee flex/ext      Push/pull on [employer] 5m   Other:    Specific Instructions for next treatment:     Treatment Charges: Mins Units   []  Modalities     []  Ther Exercise     []  Manual Therapy     []  Ther Activities     [x]  Aquatics 35 2   []  Other       Assessment: [x] Progressing toward goals. Cont with aquatic ex as noted in log above with good tolerance. Progressed his program with increased reps for LE ex. Addition of UE ex to work on stability while in the water, good follow through. No pain behaviors observed throughout duration of treatment, just mentions his knees feel \"tight. \". Ended with deep water ex followed by stretches for muscle relaxation and pain relief. [] No change. [] Other:  STG: (to be met in 6 treatments)  ? Pain: Patient will report pain as 0/10 at rest   ? ROM: Patient will improve (L) knee AROM to 0-120 deg in order to increase ease of negotiating stairs   ? Function: Patient will be able to ambulate around clinic independently with increased sherry and increased stance time on (L) LE  Patient to be independent with home exercise program as demonstrated by performance with correct form without cues. LTG: (to be met in 12 treatments)  Patient will improve LEFS to <50% functionally impaired in order to indicate improved overall quality of life  Patient will improve (B) LE strength to 4+/5 in order to increase walking tolerance throughout the community   Patient will improve (L) SLS to 30 sec in order to improve balance and stability within (B) LE  Patient will report pain at the worst as 2/10                    Patient goals: \"Pain reduction and ability to walk with minimal pain \"    Pt. Education:  [x] Yes  [] No  [] Reviewed Prior HEP/Ed  Method of Education: [x] Verbal  [x] Demo  [] Written  Comprehension of Education:  [x] Verbalizes understanding.   [x] Demonstrates understanding. [] Needs review. [] Demonstrates/verbalizes HEP/Ed previously given. Plan: [x] Continue per plan of care.    [] Other:      Time In: 3:23 pm         Time Out: 4:25 pm    Electronically signed by:  Abundio Narvaez PTA

## 2023-01-30 ENCOUNTER — HOSPITAL ENCOUNTER (OUTPATIENT)
Dept: PHYSICAL THERAPY | Facility: CLINIC | Age: 68
Setting detail: THERAPIES SERIES
Discharge: HOME | End: 2023-01-30
Payer: MEDICARE

## 2023-01-30 PROCEDURE — 97113 AQUATIC THERAPY/EXERCISES: CPT

## 2023-01-30 NOTE — FLOWSHEET NOTE
[x] [facility]  [address] [facility]  P: [phone]  F: [phone]     Physical Therapy Daily  Aquatic Treatment Note    Date:  2023  Patient Name:  Kelby Cushing    :  1955  MRN: [id number]  Physician: Hanny Kraft DO                           Insurance: [facility], per lisbet yr, no auth required, vs. [employer], no co pay, 10% co insurance, 800 ded. Medical Diagnosis:   Diagnosis   M17.0 (ICD-10-CM) - Bilateral primary osteoarthritis of knee                             Rehab Codes: M25.562, M25.561, M25.662, R26.89, M62.81  Onset date: 2023 (script)             Next 's appt.: PRN    Visit# / total visits:  PN by vs. 10  Cancels/No Shows:     Subjective:    Pain:  [] Yes  [x] No Location: bilat knees Pain Rating: (0-10 scale) 0/10  Pain altered Tx:  [x] No  [] Yes  Action:  Comments: Pt reports no pain today but has soreness after therapy visits but feels this is normal.  Pt feels combo of cortisone inj and therapy is helping him with less pain and better function overall.      Objective:     KEY  B = Belt G = Gloves N = Noodle   C = Cuffs K = Kickboard P = Paddles   CC = Cervical Collar L = Laps T = Theratube   DB = Dumbells M = Minutes W = Weights     Exercises/Activities  Warm-up/Amb  Dynamic Exercises    Forward 3L March 3L   Sideways 3L Squat 3L   Backwards 3L Retro HS curls      Retro SLR    Stretches  Braiding    Gastroc/Soleus standing at wall 20\"x3 Heel to Toe amb    Hamstring foot on stair 20\"x3 Toe amb    Hip flexor  Heel amb    Piriformis      SKTC      Pec Stretch      Post Deltoid  Static Exercises UE      Shoulder flex/ext 15   Static Exercises LE  Shoulder abd/add 15   Heel/toe raises 20 Shoulder H.  abd/add 15   March 20 Shoulder IR/ER    Mini-squats 20 Rowing    4-way hip  20 Arm Circles    Hamstring curls 20 UT shrugs/rolls    Hip Circles/Fig 8  Scap squeezes    Ankle ROM  Diagonals 1/2    Lunges   Elbow flex/ext      Pron/Sup    Functional Exercise  Wrist AROM    Step 20 fwd/lat     Wall Push-ups  Deep H20/    SLS  Bike 3m   Breast Stroke on Noodle  Hip abd/add 3m   Noodle Twist  Hip flex/ext    Noodle Push down  Hip IR/ER    Kickboard push/pull  Knee flex/ext      Push/pull on [employer] 5m   Other:    Specific Instructions for next treatment:     Treatment Charges: Mins Units   []  Modalities     []  Ther Exercise     []  Manual Therapy     []  Ther Activities     [x]  Aquatics 35 2   []  Other       Assessment: [x] Progressing toward goals. Cont with aquatic ex as noted in log above with good tolerance. Progressed his program with increased reps for step ups and UE ex. VC for ex recall. Pt feels good after tx with only slight muscle fatigue. [] No change. [] Other:  STG: (to be met in 6 treatments)  ? Pain: Patient will report pain as 0/10 at rest   ? ROM: Patient will improve (L) knee AROM to 0-120 deg in order to increase ease of negotiating stairs   ? Function: Patient will be able to ambulate around clinic independently with increased sherry and increased stance time on (L) LE  Patient to be independent with home exercise program as demonstrated by performance with correct form without cues. LTG: (to be met in 12 treatments)  Patient will improve LEFS to <50% functionally impaired in order to indicate improved overall quality of life  Patient will improve (B) LE strength to 4+/5 in order to increase walking tolerance throughout the community   Patient will improve (L) SLS to 30 sec in order to improve balance and stability within (B) LE  Patient will report pain at the worst as 2/10                    Patient goals: \"Pain reduction and ability to walk with minimal pain \"    Pt. Education:  [x] Yes  [] No  [] Reviewed Prior HEP/Ed  Method of Education: [x] Verbal  [x] Demo  [] Written  Comprehension of Education:  [x] Verbalizes understanding. [x] Demonstrates understanding.   [] Needs review. [] Demonstrates/verbalizes HEP/Ed previously given. Plan: [x] Continue per plan of care.    [] Other:      Time In: 3:30 pm         Time Out: 4:30 pm    Electronically signed by:  Matteo Powers PTA

## 2023-02-02 ENCOUNTER — HOSPITAL ENCOUNTER (OUTPATIENT)
Dept: PHYSICAL THERAPY | Facility: CLINIC | Age: 68
Setting detail: THERAPIES SERIES
Discharge: HOME | End: 2023-02-02
Payer: MEDICARE

## 2023-02-02 PROCEDURE — 97113 AQUATIC THERAPY/EXERCISES: CPT

## 2023-02-02 NOTE — FLOWSHEET NOTE
[x] [facility]  [address]  P: [phone]  F: [phone]     Physical Therapy Daily  Aquatic Treatment Note    Date:  2023  Patient Name:  Farooq Neumann    :  1955  MRN: [id number]  Physician: Mehran Candelario DO                           Insurance: [facility], per lisbet yr, no auth required, vs. Bom[employer], no co pay, 10% co insurance, 800 ded. Medical Diagnosis:   Diagnosis   M17.0 (ICD-10-CM) - Bilateral primary osteoarthritis of knee                             Rehab Codes: M25.562, M25.561, M25.662, R26.89, M62.81  Onset date: 2023 (script)             Next Dr's appt.: PRN    Visit# / total visits:  PN by vs. 10  Cancels/No Shows:     Subjective:    Pain:  [] Yes  [x] No Location: bilat knees Pain Rating: (0-10 scale) 0/10  Pain altered Tx:  [x] No  [] Yes  Action:  Comments: Pt states he is feeling good today, no pain to report.      Objective:     KEY  B = Belt G = Gloves N = Noodle   C = Cuffs K = Kickboard P = Paddles   CC = Cervical Collar L = Laps T = Theratube   DB = Dumbells M = Minutes W = Weights     Exercises/Activities  Warm-up/Amb 2/2 Dynamic Exercises 2/2   Forward 3L March 3L   Sideways 3L Squat 3L   Backwards 3L Retro HS curls      Retro SLR    Stretches  Braiding    Gastroc/Soleus standing at wall 20\"x3 Heel to Toe amb    Hamstring foot on stair 20\"x3 Toe amb    Hip flexor  Heel amb    Piriformis      SKTC      Pec Stretch      Post Deltoid  Static Exercises UE Blue paddles     Shoulder flex/ext 20   Static Exercises LE  Shoulder abd/add 20   Heel/toe raises 20 Shoulder H.  abd/add 20   Marches 20 Shoulder IR/ER    Mini-squats 20 Rowing    4-way hip  20 Arm Circles    Hamstring curls 20 UT shrugs/rolls    Hip Circles/Fig 8  Scap squeezes    Ankle ROM  Diagonals 1/2    Lunges   Elbow flex/ext      Pron/Sup    Functional Exercise  Wrist AROM    Step 20 fwd/lat     Wall Push-ups  Deep H20/    SLS  Bike    Breast Stroke on Noodle  Hip abd/add 3m   Noodle Twist  Hip flex/ext    Noodle Push down  Hip IR/ER    Kickboard push/pull  Knee flex/ext      Push/pull on [employer] 5m   Other:    Specific Instructions for next treatment:     Treatment Charges: Mins Units   []  Modalities     []  Ther Exercise     []  Manual Therapy     []  Ther Activities     [x]  Aquatics 35 2   []  Other       Assessment: [x] Progressing toward goals. Continued with aquatic ex as noted in log above with good tolerance. Progressed his program with addition of paddles to UE ex. Cues needed with squats to keep heels down, good follow through. No increase in pain with progression through treatment. He is scheduled for re-check with primary therapist at next visit, possible transition to land therapy d/t reduced sx.     [] No change. [] Other:  STG: (to be met in 6 treatments)  ? Pain: Patient will report pain as 0/10 at rest   ? ROM: Patient will improve (L) knee AROM to 0-120 deg in order to increase ease of negotiating stairs   ? Function: Patient will be able to ambulate around clinic independently with increased sherry and increased stance time on (L) LE  Patient to be independent with home exercise program as demonstrated by performance with correct form without cues. LTG: (to be met in 12 treatments)  Patient will improve LEFS to <50% functionally impaired in order to indicate improved overall quality of life  Patient will improve (B) LE strength to 4+/5 in order to increase walking tolerance throughout the community   Patient will improve (L) SLS to 30 sec in order to improve balance and stability within (B) LE  Patient will report pain at the worst as 2/10                    Patient goals: \"Pain reduction and ability to walk with minimal pain \"    Pt. Education:  [x] Yes  [] No  [] Reviewed Prior HEP/Ed  Method of Education: [x] Verbal  [x] Demo  [] Written  Comprehension of Education:  [x] Verbalizes understanding.   [x] Demonstrates understanding. [] Needs review. [] Demonstrates/verbalizes HEP/Ed previously given. Plan: [x] Continue per plan of care.    [] Other:      Time In: 3:26 pm         Time Out: 4:25 pm    Electronically signed by:  Flex Huston PTA

## 2023-02-07 ENCOUNTER — HOSPITAL ENCOUNTER (OUTPATIENT)
Dept: PHYSICAL THERAPY | Facility: CLINIC | Age: 68
Setting detail: THERAPIES SERIES
Discharge: HOME | End: 2023-02-07
Payer: MEDICARE

## 2023-02-07 PROCEDURE — 97110 THERAPEUTIC EXERCISES: CPT

## 2023-02-07 PROCEDURE — 97530 THERAPEUTIC ACTIVITIES: CPT

## 2023-02-07 NOTE — PROGRESS NOTES
[] [facility]  [facility]  [address]  [phone]  F: [phone] [] [address]  [place] [address]   [address]  P: [phone]  F: [phone] [x] [address]  [address]  P: [phone]  F: [phone] [] [address]  P: [phone]  F: [phone] [] [address]  [facility]   [address]   [place]: [phone]  F: [phone]      Physical Therapy Daily Treatment Note    Date:  2023  Patient Name:  Jami Alexander    :  1955  MRN: [id number]  Physician: Giuliano Bynum DO                           Insurance: [facility], per lisbet yr, no auth required, vs. [employer], no co pay, 10% co insurance, 800 ded. Medical Diagnosis:   Diagnosis   M17.0 (ICD-10-CM) - Bilateral primary osteoarthritis of knee                             Rehab Codes: M25.562, M25.561, M25.662, R26.89, M62.81  Onset date: 2023 (script)             Next 's appt.: PRN     Visit# / total visits:  PN by vs. 10                      Cancels/No Shows:       Subjective:    Pain:  [] Yes  [x] No Location: (B) knees Pain Rating: (0-10 scale) 0/10  Pain altered Tx:  [x] No  [] Yes  Action:  Comments: Patient reports to physical therapy this date stating that he has been doing really well. States that the pain has been much better and has been able to resume walking his dogs without pain. States he has less pain at night as well. States that he would like to cont to improve in (B) knee ROM and strength. Objective:   ROM  ° A/P STRENGTH TESTS (+/-) Left Right Not Tested     Left Right Left Right Ant.  Drawer     [x]    Hip Flex     5 5 Post. Drawer     [x]    Ext     5 5 Lachmans     [x]    ER 35 30 4 4 Valgus Stress     [x]    IR 20 30 5 5 Varus Stress     [x]    ABD     4 4+ Beverlys     [x]    ADD         Bounce Home     [x]    Knee Flex 118 120 4 4 Apleys Dist.     [x]    Ext 0 0 5 5 Hip Scouring     [x]    Ankle DF 0 -5 5 5 NGOZIs     [x]    PF 70 70 5 5 Piriformis     [x]    INV         Zacs     [x]    EVER         Talor Tilt     [x]              Pat-Fem Grind          Modalities:   Precautions:  Exercises:  Exercise Reps/ Time Weight/ Level Comments   Nustep                  Supine Calf S  30\"x3       HS S 30\"x3                 Heel Slide x20   (L) side only   SLR 2x10       Bridges 2x10       Clamshells 2x10       SL Hip Abd 2x10                  Other:      Treatment Charges: Mins Units   []  Modalities     [x]  Ther Exercise 33 2   []  Manual Therapy     [x]  Ther Activities 13 1   []  Aquatics     []  Vasocompression     []  Other     Total Treatment time 46 3       Assessment: [x] Progressing toward goals. Completed reassessment this date with good progress demonstrated by patient. Patient met 3/4 short term goals and 0/4 long term goals at this time. Patient demonstrates improvements in pain tolerance, (L) knee AROM, gait mechanics, (B) LE strength and balance. Patient would continue to benefit from skilled physical therapy in order to continue to improve (L) knee AROM, (B) LE strength and balance. Plan to transition patient to land based physical therapy for remaining sessions to improve impairments. Patient agreeable to plan at this time. Initiated land based treatment this date with good tolerance demonstrated by patient. Completed stretches to (B) LE in order to improve mobility. Followed with mat mobility and strengthening ex per ex log. Initiated 3-way hip, TG squats and TG HR with good tolerance demonstrated by patient. Plan to progress land based therapy at next appointment with focus on hip strength and balance. Patient leaves session with no pain. [] No change. [] Other:      STG: (to be met in 6 treatments)  ? Pain: Patient will report pain as 0/10 at rest Patient reports pain at rest as 0/10 (MET)   ? ROM: Patient will improve (L) knee AROM to 0-120 deg in order to increase ease of negotiating stairs (L) knee AROM: 0-118 (ongoing)   ? Function: Patient will be able to ambulate around clinic independently with increased sherry and increased stance time on (L) LE (MET)   Patient to be independent with home exercise program as demonstrated by performance with correct form without cues. (MET)   LTG: (to be met in 12 treatments)  Patient will improve LEFS to <50% functionally impaired in order to indicate improved overall quality of life  Patient will improve (B) LE strength to 4+/5 in order to increase walking tolerance throughout the community (ongoing)   Patient will improve (L) SLS to 30 sec in order to improve balance and stability within (B) LE (L) SLS: 20 sec (ongoing)   Patient will report pain at the worst as 2/10 Patient reports pain on average in the last week as 4/10 with ambulating longer distances (ongoing)     Pt. Education:  [x] Yes  [x] No  [] Reviewed Prior HEP/Ed  Method of Education: [x] Verbal  [x] Demo  [x] Written  Comprehension of Education:  [x] Verbalizes understanding. [x] Demonstrates understanding. [x] Needs review. [] Demonstrates/verbalizes HEP/Ed previously given. Plan: [x] Continue current frequency toward long and short term goals.     [x] Specific Instructions for subsequent treatments: Progress land based ex, progress hip strength, balance and (L) knee mobility       Time In:12:00 pm            Time Out: 12:46 pm    Electronically signed by:  Durene Goldmann, PT

## 2023-02-09 ENCOUNTER — HOSPITAL ENCOUNTER (OUTPATIENT)
Dept: PHYSICAL THERAPY | Facility: CLINIC | Age: 68
Setting detail: THERAPIES SERIES
Discharge: HOME | End: 2023-02-09
Payer: MEDICARE

## 2023-02-09 PROCEDURE — 97110 THERAPEUTIC EXERCISES: CPT

## 2023-02-09 NOTE — FLOWSHEET NOTE
[] [facility]) [facility] &  Therapy  [address]  [phone]  F: [phone] [] [address]  [facility] [place] [address]   [address]  P: [phone]  F: [phone] [x] [address]  [address]  P: [phone]  F: [phone] [] [address]  P: [phone]  F: [phone] [] [address]  [facility]   [address]   [place]: [phone]  F: [phone]      Physical Therapy Daily Treatment Note    Date:  2023  Patient Name:  Sabrina Deras    :  1955  MRN: [id number]  Physician: Soledad West DO                           Insurance: [facility], per lisbet yr, no auth required, vs. [employer], no co pay, 10% co insurance, 800 ded.   Medical Diagnosis:   Diagnosis   M17.0 (ICD-10-CM) - Bilateral primary osteoarthritis of knee                             Rehab Codes: M25.562, M25.561, M25.662, R26.89, M62.81  Onset date: 2023 (script)             Next 's appt.: PRN     Visit# / total visits:  PN by vs. 18                   Cancels/No Shows:       Subjective:    Pain:  [] Yes  [x] No Location: (B) knees Pain Rating: (0-10 scale) 0/10  Pain altered Tx:  [x] No  [] Yes  Action:  Comments: He reports no pain at arrival.     Objective:  Modalities:   Precautions:  Exercises:  Exercise Reps/ Time Weight/ Level Comments   Nustep                  Supine Calf S  30\"x3       HS S 30\"x3                 3 way hip X15 B lime    TG squats/HR 2x10ea L18    Tandem Stance 2x30\" ea // bars One set eyes open, one set eyes closed         SLR 2x10       Bridges 2x10       Clamshells 2x10  orange     SL Hip Abd 2x10                      Other:    Treatment Charges: Mins Units   []  Modalities     [x]  Ther Exercise 45 3   []  Manual Therapy     [] Ther Activities     []  Aquatics     []  Vasocompression     []  Other     Total Treatment time 45 3       Assessment: [x] Progressing toward goals. Initiated session with Scifit for a warm up followed by stretches. Added tandem stance to work on balance, performing one set with eyes closed and one set with eyes open. Reviewed mat ex performed at last treatment with good tolerance, only minimal cues needed for technique. Able to add tband to clamshells. Provided with HEP handout detailed below. No increase in pain with progression through treatment only muscle fatigue. [] No change. [] Other:  STG: (to be met in 6 treatments)  ? Pain: Patient will report pain as 0/10 at rest Patient reports pain at rest as 0/10 (MET)   ? ROM: Patient will improve (L) knee AROM to 0-120 deg in order to increase ease of negotiating stairs (L) knee AROM: 0-118 (ongoing)   ? Function: Patient will be able to ambulate around clinic independently with increased sherry and increased stance time on (L) LE (MET)   Patient to be independent with home exercise program as demonstrated by performance with correct form without cues. (MET)   LTG: (to be met in 12 treatments)  Patient will improve LEFS to <50% functionally impaired in order to indicate improved overall quality of life  Patient will improve (B) LE strength to 4+/5 in order to increase walking tolerance throughout the community (ongoing)   Patient will improve (L) SLS to 30 sec in order to improve balance and stability within (B) LE (L) SLS: 20 sec (ongoing)   Patient will report pain at the worst as 2/10 Patient reports pain on average in the last week as 4/10 with ambulating longer distances (ongoing)     Pt. Education:  [x] Yes  [x] No  [] Reviewed Prior HEP/Ed  Method of Education: [x] Verbal  [x] Demo  [x] Written  Access Code: [email or web address]  URL: [email or web address]  Date: 02/09/2023  Prepared by: Oli Sheffield    Exercises  Gastroc Stretch on Step - 1 x daily - 7 x weekly - 3 reps - 30\" hold  Standing Hamstring Stretch with Step - 1 x daily - 7 x weekly - 3 reps - 30\" hold  Standing Repeated Hip Flexion with Resistance - 1 x daily - 7 x weekly - 2 sets - 10 reps  Standing Repeated Hip Abduction with Resistance - 1 x daily - 7 x weekly - 2 sets - 10 reps  Standing Repeated Hip Extension with Resistance - 1 x daily - 7 x weekly - 2 sets - 10 reps  Tandem Stance with Support - 1 x daily - 7 x weekly - 2 reps - 30\" hold  Supine Bridge - 1 x daily - 7 x weekly - 2 sets - 10 reps - 3\" hold  Supine Active Straight Leg Raise - 1 x daily - 7 x weekly - 2 sets - 10 reps  Clamshell - 1 x daily - 7 x weekly - 2 sets - 10 reps  Sidelying Hip Abduction - 1 x daily - 7 x weekly - 2 sets - 10 reps    Comprehension of Education:  [x] Verbalizes understanding. [x] Demonstrates understanding. [x] Needs review. [] Demonstrates/verbalizes HEP/Ed previously given. Plan: [x] Continue current frequency toward long and short term goals.     [x] Specific Instructions for subsequent treatments: Progress land based ex, progress hip strength, balance and (L) knee mobility       Time In: 12:00 pm            Time Out: 12:48 pm    Electronically signed by:  Mike Moser PTA

## 2023-02-14 ENCOUNTER — HOSPITAL ENCOUNTER (OUTPATIENT)
Dept: PHYSICAL THERAPY | Facility: CLINIC | Age: 68
Setting detail: THERAPIES SERIES
Discharge: HOME | End: 2023-02-14
Payer: MEDICARE

## 2023-02-14 PROCEDURE — 97110 THERAPEUTIC EXERCISES: CPT

## 2023-02-14 NOTE — FLOWSHEET NOTE
[] [facility]) [facility] CENTER &  Therapy  [address]  [phone]  F: [phone] [] [address]  [employer] 36   [address]  P: [phone]  F: [phone] [x] [address]  [address]  P: [phone]  F: [phone] [] [address]  P: [phone]  F: [phone] [] [address]  [facility]   [address]   [place]: [phone]  F: [phone]      Physical Therapy Daily Treatment Note    Date:  2023  Patient Name:  Regulo Hurley    :  1955  MRN: [id number]  Physician: Adam Goldberg DO                           Insurance: [facility], per lisbet yr, no auth required, vs. [employer], no co pay, 10% co insurance, 800 ded. Medical Diagnosis:   Diagnosis   M17.0 (ICD-10-CM) - Bilateral primary osteoarthritis of knee                             Rehab Codes: M25.562, M25.561, M25.662, R26.89, M62.81  Onset date: 2023 (script)             Next 's appt.: PRN     Visit# / total visits: 10/12 PN by vs. 18                   Cancels/No Shows:       Subjective:    Pain:  [x] Yes  [] No Location: (B) knees Pain Rating: (0-10 scale) 2/10  Pain altered Tx:  [x] No  [] Yes  Action:  Comments: pt reporting dull achy pain in bilateral knees.       Objective:  Modalities:   Precautions:  Exercises:  Exercise Reps/ Time Weight/ Level Comments   Nustep 8' L3                SB calf stretch  30\"x3       Stool HS stretch 30\"x3                 3 way hip X15 B lime    TG squats/HR 2x15ea L20    Tandem Stance 2x30\" ea // bars One set eyes open, one set eyes closed   Step ups  15x 6\"    Monsterwalks  4 laps  lime // bars          SLR 2x10  2#     Bridges 2x10       Clamshells 2x10  orange     SL Hip Abd 2x10 Other:    Treatment Charges: Mins Units   []  Modalities     [x]  Ther Exercise 45 3   []  Manual Therapy     []  Ther Activities     []  Aquatics     []  Vasocompression     []  Other     Total Treatment time 45 3       Assessment: [x] Progressing toward goals. Continued with scifit for warmup followed by standing stretches with good tolerance. Progressed program with adding in step ups and monsteralks for increased glut strength with no increase in symptoms noted. Added weight for SLR and increased level for TG with good tolerance. Continue to progress as tolerable. [] No change. [] Other:  STG: (to be met in 6 treatments)  ? Pain: Patient will report pain as 0/10 at rest Patient reports pain at rest as 0/10 (MET)   ? ROM: Patient will improve (L) knee AROM to 0-120 deg in order to increase ease of negotiating stairs (L) knee AROM: 0-118 (ongoing)   ? Function: Patient will be able to ambulate around clinic independently with increased sherry and increased stance time on (L) LE (MET)   Patient to be independent with home exercise program as demonstrated by performance with correct form without cues. (MET)   LTG: (to be met in 12 treatments)  Patient will improve LEFS to <50% functionally impaired in order to indicate improved overall quality of life  Patient will improve (B) LE strength to 4+/5 in order to increase walking tolerance throughout the community (ongoing)   Patient will improve (L) SLS to 30 sec in order to improve balance and stability within (B) LE (L) SLS: 20 sec (ongoing)   Patient will report pain at the worst as 2/10 Patient reports pain on average in the last week as 4/10 with ambulating longer distances (ongoing)     Pt. Education:  [x] Yes  [x] No  [] Reviewed Prior HEP/Ed  Method of Education: [x] Verbal  [x] Demo  [x] Written  Access Code: [email or web address]  URL: [email or web address]  Date: 02/09/2023  Prepared by: Alfonzo Iniguez    Exercises  Gastroc Stretch on Step - 1 x daily - 7 x weekly - 3 reps - 30\" hold  Standing Hamstring Stretch with Step - 1 x daily - 7 x weekly - 3 reps - 30\" hold  Standing Repeated Hip Flexion with Resistance - 1 x daily - 7 x weekly - 2 sets - 10 reps  Standing Repeated Hip Abduction with Resistance - 1 x daily - 7 x weekly - 2 sets - 10 reps  Standing Repeated Hip Extension with Resistance - 1 x daily - 7 x weekly - 2 sets - 10 reps  Tandem Stance with Support - 1 x daily - 7 x weekly - 2 reps - 30\" hold  Supine Bridge - 1 x daily - 7 x weekly - 2 sets - 10 reps - 3\" hold  Supine Active Straight Leg Raise - 1 x daily - 7 x weekly - 2 sets - 10 reps  Clamshell - 1 x daily - 7 x weekly - 2 sets - 10 reps  Sidelying Hip Abduction - 1 x daily - 7 x weekly - 2 sets - 10 reps    Comprehension of Education:  [x] Verbalizes understanding. [x] Demonstrates understanding. [x] Needs review. [] Demonstrates/verbalizes HEP/Ed previously given. Plan: [x] Continue current frequency toward long and short term goals.     [x] Specific Instructions for subsequent treatments: Progress land based ex, progress hip strength, balance and (L) knee mobility       Time In: 1:00 pm            Time Out: 1:47 pm    Electronically signed by:  Jill Mahajan PTA

## 2023-02-17 ENCOUNTER — HOSPITAL ENCOUNTER (OUTPATIENT)
Dept: PHYSICAL THERAPY | Facility: CLINIC | Age: 68
Setting detail: THERAPIES SERIES
Discharge: HOME | End: 2023-02-17
Payer: MEDICARE

## 2023-02-17 PROCEDURE — 97110 THERAPEUTIC EXERCISES: CPT

## 2023-02-17 NOTE — FLOWSHEET NOTE
[] [facility]) [facility] CENTER &  Therapy  [address]  [phone]  F: [phone] [] [address]  [facility] [place] [facility]\"" 36   [address]  P: [phone]  F: [phone] [x] [address]  [address]  P: [phone]  F: [phone] [] [address]  P: [phone]  F: [phone] [] [address]  [facility]   [address]   [place]: [phone]  F: [phone]      Physical Therapy Daily Treatment Note    Date:  2023  Patient Name:  Andressa Bustamante    :  1955  MRN: [id number]  Physician: Alana Ocampo DO                           Insurance: [facility], per lisbet yr, no auth required, vs. [employer], no co pay, 10% co insurance, 800 ded. Medical Diagnosis:   Diagnosis   M17.0 (ICD-10-CM) - Bilateral primary osteoarthritis of knee                             Rehab Codes: M25.562, M25.561, M25.662, R26.89, M62.81  Onset date: 2023 (script)             Next 's appt.: PRN     Visit# / total visits:  PN by vs. 18                   Cancels/No Shows:       Subjective:    Pain:  [] Yes  [x] No Location: (B) knees Pain Rating: (0-10 scale) 0/10  Pain altered Tx:  [x] No  [] Yes  Action:  Comments: pt arrived with no pain. States he did not have any increase in pain or soreness after last visit.      Objective:  Modalities:   Precautions:  Exercises:  Exercise Reps/ Time Weight/ Level Comments   Nustep 8' L3                SB calf stretch 30\"x3       Stool HS stretch 30\"x3                 3 way hip X20 B lime    SL TG squats/HR 2x15ea L14    Tandem Stance 2x30\" ea // bars One set eyes open, one set eyes closed   Step ups  20x 6\" Fwd, lat   Monsterwalks  4 laps  lime // bars          SLR 3x10  2#     Bridges 2x10 lime      Clamshells 2x10 lime     SL Hip Abd 2x10 lime                    Other:    Treatment Charges: Mins Units   []  Modalities     [x]  Ther Exercise 45 3   []  Manual Therapy     []  Ther Activities     []  Aquatics     []  Vasocompression     []  Other     Total Treatment time 45 3       Assessment: [x] Progressing toward goals. Progressed pt with increasing reps for step ups and 3 way hip, increased resistance for mat work, as well as added in SL TG squats and lateral step ups with good tolerance. Pt noting no increase in pain just increased muscle fatigue and soreness. [] No change. [] Other:  STG: (to be met in 6 treatments)  ? Pain: Patient will report pain as 0/10 at rest Patient reports pain at rest as 0/10 (MET)   ? ROM: Patient will improve (L) knee AROM to 0-120 deg in order to increase ease of negotiating stairs (L) knee AROM: 0-118 (ongoing)   ? Function: Patient will be able to ambulate around clinic independently with increased sherry and increased stance time on (L) LE (MET)   Patient to be independent with home exercise program as demonstrated by performance with correct form without cues. (MET)   LTG: (to be met in 12 treatments)  Patient will improve LEFS to <50% functionally impaired in order to indicate improved overall quality of life  Patient will improve (B) LE strength to 4+/5 in order to increase walking tolerance throughout the community (ongoing)   Patient will improve (L) SLS to 30 sec in order to improve balance and stability within (B) LE (L) SLS: 20 sec (ongoing)   Patient will report pain at the worst as 2/10 Patient reports pain on average in the last week as 4/10 with ambulating longer distances (ongoing)     Pt. Education:  [x] Yes  [x] No  [] Reviewed Prior HEP/Ed  Method of Education: [x] Verbal  [x] Demo  [x] Written  Access Code: [email or web address]  URL: [email or web address]  Date: 02/09/2023  Prepared by: Nati Harry    Exercises  Gastroc Stretch on Step - 1 x daily - 7 x weekly - 3 reps - 30\" hold  Standing Hamstring Stretch with Step - 1 x daily - 7 x weekly - 3 reps - 30\" hold  Standing Repeated Hip Flexion with Resistance - 1 x daily - 7 x weekly - 2 sets - 10 reps  Standing Repeated Hip Abduction with Resistance - 1 x daily - 7 x weekly - 2 sets - 10 reps  Standing Repeated Hip Extension with Resistance - 1 x daily - 7 x weekly - 2 sets - 10 reps  Tandem Stance with Support - 1 x daily - 7 x weekly - 2 reps - 30\" hold  Supine Bridge - 1 x daily - 7 x weekly - 2 sets - 10 reps - 3\" hold  Supine Active Straight Leg Raise - 1 x daily - 7 x weekly - 2 sets - 10 reps  Clamshell - 1 x daily - 7 x weekly - 2 sets - 10 reps  Sidelying Hip Abduction - 1 x daily - 7 x weekly - 2 sets - 10 reps    Comprehension of Education:  [x] Verbalizes understanding. [x] Demonstrates understanding. [x] Needs review. [] Demonstrates/verbalizes HEP/Ed previously given. Plan: [x] Continue current frequency toward long and short term goals.     [x] Specific Instructions for subsequent treatments: Progress land based ex, progress hip strength, balance and (L) knee mobility       Time In: 1:00 pm            Time Out: 1:55 pm    Electronically signed by:  Silke Nassar PTA

## 2023-02-21 ENCOUNTER — HOSPITAL ENCOUNTER (OUTPATIENT)
Dept: PHYSICAL THERAPY | Facility: CLINIC | Age: 68
Setting detail: THERAPIES SERIES
Discharge: HOME | End: 2023-02-21
Payer: MEDICARE

## 2023-02-21 PROCEDURE — 97110 THERAPEUTIC EXERCISES: CPT

## 2023-02-21 NOTE — DISCHARGE SUMMARY
[] [facility]) [facility] CENTER &  Therapy  [address]  [phone]  F: [phone] [] [address]  [place] [address]   [address]  P: [phone]  F: [phone] [x] [address]  [address]  P: [phone]  F: [phone] [] [address]  P: [phone]  F: [phone] [] [address]  [facility]   [address]   [place]: [phone]  F: [phone]      Physical Therapy Daily Treatment Note    Date:  2023  Patient Name:  Uday Wallace    :  1955  MRN: [id number]  Physician: Marciano Cardenas DO                           Insurance: [facility], per lisbet yr, no auth required, vs. [employer], no co pay, 10% co insurance, 800 ded. Medical Diagnosis:   Diagnosis   M17.0 (ICD-10-CM) - Bilateral primary osteoarthritis of knee                             Rehab Codes: M25.562, M25.561, M25.662, R26.89, M62.81  Onset date: 2023 (script)             Next 's appt.: PRN     Visit# / total visits:  PN by vs. 18                   Cancels/No Shows:       Subjective:    Pain:  [] Yes  [x] No Location: (B) knees Pain Rating: (0-10 scale) 0/10  Pain altered Tx:  [x] No  [] Yes  Action:  Comments: Pt reports no pain or issues to report at arrival. Mentions HEP compliance. Objective:   ROM  ° A/P STRENGTH TESTS (+/-) Left Right Not Tested     Left Right Left Right Ant.  Drawer     [x]    Hip Flex     5 5 Post. Drawer     [x]    Ext     5 5 Lachmans     [x]    ER 35 30 5 4+ Valgus Stress     [x]    IR 20 30 5 5 Varus Stress     [x]    ABD     5 5 Beverlys     [x]    ADD         Bounce Home     [x]    Knee Flex 120 120 5 5 Apleys Dist.     [x]    Ext 0 0 5 5 Hip Scouring     [x]    Ankle DF 0 -5 5 5 NGOZIs     [x]    PF 70 70 5 5 Piriformis     [x]    INV         Zacs     [x]    EVER         Talor Tilt     [x]              Pat-Fem Grind         Modalities:   Precautions:  Exercises:  Exercise Reps/ Time Weight/ Level Comments    Nustep 10' L3    x              SB calf stretch 30\"x3     x   Stool HS stretch 30\"x3     x              3 way hip X20 B lime  x   SL TG squats/HR 2x15ea L14  x   Tandem Stance 2x30\" ea // bars One set eyes open, one set eyes closed x   Step ups  20x 6\" Fwd, lat -   Monsterwalks  4 laps  lime // bars  x          SLR 3x10  2#   -   Bridges 2x10 lime    x   Clamshells 2x10 lime   x   SL Hip Abd 2x10 lime   x                    Other:    Treatment Charges: Mins Units   []  Modalities     [x]  Ther Exercise 42 3   []  Manual Therapy     [x]  Ther Activities     []  Aquatics     []  Vasocompression     []  Other     Total Treatment time 42 3       Assessment: [x] Progressing toward goals. Cont with Scifit warm up followed by stretches for LEs. Reviewed strengthening ex as noted in log above with good tolerance. Min cues needed for postural awareness and technique. No increase in pain with progression throughout treatment only muscular fatigue. Reviewed HEP with pt verbalizing understanding. At this point, plan for D/C to independent HEP. PT Reassessment:      STG: (to be met in 6 treatments)  ? Pain: Patient will report pain as 0/10 at rest Patient reports pain at rest as 0/10 (MET)   ? ROM: Patient will improve (L) knee AROM to 0-120 deg in order to increase ease of negotiating stairs (L) knee AROM: 0-118 (ongoing)   ?  Function: Patient will be able to ambulate around clinic independently with increased sherry and increased stance time on (L) LE (MET)   Patient to be independent with home exercise program as demonstrated by performance with correct form without cues. (MET)   LTG: (to be met in 12 treatments)  Patient will improve LEFS to <50% functionally impaired in order to indicate improved overall quality of life Patient reports 24% functional impairment (MET)   Patient will improve (B) LE strength to 4+/5 in order to increase walking tolerance throughout the community (MET)   Patient will improve (L) SLS to 30 sec in order to improve balance and stability within (B) LE (L) SLS: 20 sec (ongoing)   Patient will report pain at the worst as 2/10 Patient reports 0/10 pain at the worst (MET)     Pt. Education:  [x] Yes  [x] No  [] Reviewed Prior HEP/Ed  Method of Education: [x] Verbal  [x] Demo  [x] Written  Access Code: [email or web address]  URL: [email or web address]  Date: 02/09/2023  Prepared by: Tiffanie Connolly    Exercises  Gastroc Stretch on Step - 1 x daily - 7 x weekly - 3 reps - 30\" hold  Standing Hamstring Stretch with Step - 1 x daily - 7 x weekly - 3 reps - 30\" hold  Standing Repeated Hip Flexion with Resistance - 1 x daily - 7 x weekly - 2 sets - 10 reps  Standing Repeated Hip Abduction with Resistance - 1 x daily - 7 x weekly - 2 sets - 10 reps  Standing Repeated Hip Extension with Resistance - 1 x daily - 7 x weekly - 2 sets - 10 reps  Tandem Stance with Support - 1 x daily - 7 x weekly - 2 reps - 30\" hold  Supine Bridge - 1 x daily - 7 x weekly - 2 sets - 10 reps - 3\" hold  Supine Active Straight Leg Raise - 1 x daily - 7 x weekly - 2 sets - 10 reps  Clamshell - 1 x daily - 7 x weekly - 2 sets - 10 reps  Sidelying Hip Abduction - 1 x daily - 7 x weekly - 2 sets - 10 reps    Comprehension of Education:  [x] Verbalizes understanding. [x] Demonstrates understanding. [x] Needs review. [] Demonstrates/verbalizes HEP/Ed previously given.      Plan: [x] D/C to HEP    Time In: 12:05 pm            Time Out: 1:00 pm    Electronically signed by:  Tiffanie Connolly PTA

## 2025-06-25 ENCOUNTER — HOSPITAL ENCOUNTER (OUTPATIENT)
Dept: GENERAL RADIOLOGY | Age: 70
Discharge: HOME | End: 2025-06-27
Payer: MEDICARE

## 2025-06-25 ENCOUNTER — HOSPITAL ENCOUNTER (OUTPATIENT)
Age: 70
Discharge: HOME | End: 2025-06-27
Payer: MEDICARE

## 2025-06-25 DIAGNOSIS — Z91.81 HISTORY OF FALL: ICD-10-CM

## 2025-06-25 DIAGNOSIS — N20.0 KIDNEY STONE: ICD-10-CM

## 2025-06-25 PROCEDURE — 74018 RADEX ABDOMEN 1 VIEW: CPT

## 2025-06-25 PROCEDURE — 73140 X-RAY EXAM OF FINGER(S): CPT
